# Patient Record
Sex: MALE | Race: BLACK OR AFRICAN AMERICAN | URBAN - METROPOLITAN AREA
[De-identification: names, ages, dates, MRNs, and addresses within clinical notes are randomized per-mention and may not be internally consistent; named-entity substitution may affect disease eponyms.]

---

## 2017-02-06 ENCOUNTER — EMERGENCY (EMERGENCY)
Facility: HOSPITAL | Age: 32
LOS: 0 days | Discharge: HOME | End: 2017-02-06
Attending: EMERGENCY MEDICINE
Payer: COMMERCIAL

## 2017-02-06 VITALS
HEIGHT: 67 IN | WEIGHT: 179.9 LBS | DIASTOLIC BLOOD PRESSURE: 102 MMHG | OXYGEN SATURATION: 99 % | SYSTOLIC BLOOD PRESSURE: 149 MMHG | TEMPERATURE: 97 F | RESPIRATION RATE: 16 BRPM | HEART RATE: 63 BPM

## 2017-02-06 DIAGNOSIS — R10.13 EPIGASTRIC PAIN: ICD-10-CM

## 2017-02-06 DIAGNOSIS — K59.00 CONSTIPATION, UNSPECIFIED: ICD-10-CM

## 2017-02-06 LAB
ALBUMIN SERPL ELPH-MCNC: 4.2 G/DL — SIGNIFICANT CHANGE UP (ref 3.3–5)
ALP SERPL-CCNC: 60 U/L — SIGNIFICANT CHANGE UP (ref 40–120)
ALT FLD-CCNC: 24 U/L — SIGNIFICANT CHANGE UP (ref 12–78)
ANION GAP SERPL CALC-SCNC: 7 MMOL/L — SIGNIFICANT CHANGE UP (ref 5–17)
APPEARANCE UR: CLEAR — SIGNIFICANT CHANGE UP
AST SERPL-CCNC: 14 U/L — LOW (ref 15–37)
BASOPHILS # BLD AUTO: 0.1 K/UL — SIGNIFICANT CHANGE UP (ref 0–0.2)
BASOPHILS NFR BLD AUTO: 1.9 % — SIGNIFICANT CHANGE UP (ref 0–2)
BILIRUB SERPL-MCNC: 0.5 MG/DL — SIGNIFICANT CHANGE UP (ref 0.2–1.2)
BILIRUB UR-MCNC: NEGATIVE — SIGNIFICANT CHANGE UP
BUN SERPL-MCNC: 12 MG/DL — SIGNIFICANT CHANGE UP (ref 7–23)
CALCIUM SERPL-MCNC: 10.1 MG/DL — SIGNIFICANT CHANGE UP (ref 8.5–10.1)
CHLORIDE SERPL-SCNC: 105 MMOL/L — SIGNIFICANT CHANGE UP (ref 96–108)
CO2 SERPL-SCNC: 29 MMOL/L — SIGNIFICANT CHANGE UP (ref 22–31)
COLOR SPEC: YELLOW — SIGNIFICANT CHANGE UP
CREAT SERPL-MCNC: 0.9 MG/DL — SIGNIFICANT CHANGE UP (ref 0.5–1.3)
DIFF PNL FLD: NEGATIVE — SIGNIFICANT CHANGE UP
EOSINOPHIL # BLD AUTO: 0 K/UL — SIGNIFICANT CHANGE UP (ref 0–0.5)
EOSINOPHIL NFR BLD AUTO: 0.6 % — SIGNIFICANT CHANGE UP (ref 0–6)
GLUCOSE SERPL-MCNC: 87 MG/DL — SIGNIFICANT CHANGE UP (ref 70–99)
GLUCOSE UR QL: NEGATIVE MG/DL — SIGNIFICANT CHANGE UP
HCT VFR BLD CALC: 40.4 % — SIGNIFICANT CHANGE UP (ref 39–50)
HGB BLD-MCNC: 14.8 G/DL — SIGNIFICANT CHANGE UP (ref 13–17)
KETONES UR-MCNC: NEGATIVE — SIGNIFICANT CHANGE UP
LEUKOCYTE ESTERASE UR-ACNC: NEGATIVE — SIGNIFICANT CHANGE UP
LIDOCAIN IGE QN: 244 U/L — SIGNIFICANT CHANGE UP (ref 73–393)
LYMPHOCYTES # BLD AUTO: 2.3 K/UL — SIGNIFICANT CHANGE UP (ref 1–3.3)
LYMPHOCYTES # BLD AUTO: 36.3 % — SIGNIFICANT CHANGE UP (ref 13–44)
MCHC RBC-ENTMCNC: 28.8 PG — SIGNIFICANT CHANGE UP (ref 27–34)
MCHC RBC-ENTMCNC: 36.7 GM/DL — HIGH (ref 32–36)
MCV RBC AUTO: 78.5 FL — LOW (ref 80–100)
MONOCYTES # BLD AUTO: 0.5 K/UL — SIGNIFICANT CHANGE UP (ref 0–0.9)
MONOCYTES NFR BLD AUTO: 7.5 % — SIGNIFICANT CHANGE UP (ref 2–14)
NEUTROPHILS # BLD AUTO: 3.4 K/UL — SIGNIFICANT CHANGE UP (ref 1.8–7.4)
NEUTROPHILS NFR BLD AUTO: 53.7 % — SIGNIFICANT CHANGE UP (ref 43–77)
NITRITE UR-MCNC: NEGATIVE — SIGNIFICANT CHANGE UP
PH UR: 8 — SIGNIFICANT CHANGE UP (ref 4.8–8)
PLATELET # BLD AUTO: 210 K/UL — SIGNIFICANT CHANGE UP (ref 150–400)
POTASSIUM SERPL-MCNC: 3.9 MMOL/L — SIGNIFICANT CHANGE UP (ref 3.5–5.3)
POTASSIUM SERPL-SCNC: 3.9 MMOL/L — SIGNIFICANT CHANGE UP (ref 3.5–5.3)
PROT SERPL-MCNC: 8.2 GM/DL — SIGNIFICANT CHANGE UP (ref 6–8.3)
PROT UR-MCNC: NEGATIVE MG/DL — SIGNIFICANT CHANGE UP
RBC # BLD: 5.14 M/UL — SIGNIFICANT CHANGE UP (ref 4.2–5.8)
RBC # FLD: 13.3 % — SIGNIFICANT CHANGE UP (ref 11–15)
SODIUM SERPL-SCNC: 141 MMOL/L — SIGNIFICANT CHANGE UP (ref 135–145)
SP GR SPEC: 1.01 — SIGNIFICANT CHANGE UP (ref 1.01–1.02)
UROBILINOGEN FLD QL: NEGATIVE MG/DL — SIGNIFICANT CHANGE UP
WBC # BLD: 6.3 K/UL — SIGNIFICANT CHANGE UP (ref 3.8–10.5)
WBC # FLD AUTO: 6.3 K/UL — SIGNIFICANT CHANGE UP (ref 3.8–10.5)

## 2017-02-06 PROCEDURE — 76700 US EXAM ABDOM COMPLETE: CPT | Mod: 26

## 2017-02-06 PROCEDURE — 74177 CT ABD & PELVIS W/CONTRAST: CPT | Mod: 26

## 2017-02-06 PROCEDURE — 99285 EMERGENCY DEPT VISIT HI MDM: CPT

## 2017-02-06 RX ORDER — ONDANSETRON 8 MG/1
4 TABLET, FILM COATED ORAL ONCE
Qty: 0 | Refills: 0 | Status: COMPLETED | OUTPATIENT
Start: 2017-02-06 | End: 2017-02-06

## 2017-02-06 RX ORDER — FAMOTIDINE 10 MG/ML
20 INJECTION INTRAVENOUS ONCE
Qty: 0 | Refills: 0 | Status: COMPLETED | OUTPATIENT
Start: 2017-02-06 | End: 2017-02-06

## 2017-02-06 RX ORDER — POLYETHYLENE GLYCOL 3350 17 G/17G
17 POWDER, FOR SOLUTION ORAL
Qty: 119 | Refills: 0 | OUTPATIENT
Start: 2017-02-06 | End: 2017-02-13

## 2017-02-06 RX ADMIN — ONDANSETRON 4 MILLIGRAM(S): 8 TABLET, FILM COATED ORAL at 11:47

## 2017-02-06 RX ADMIN — FAMOTIDINE 20 MILLIGRAM(S): 10 INJECTION INTRAVENOUS at 11:44

## 2017-02-06 RX ADMIN — Medication 30 MILLILITER(S): at 11:23

## 2017-02-06 NOTE — ED PROVIDER NOTE - MEDICAL DECISION MAKING DETAILS
pt with R sided abd and back pain may be related to constipation but no inflammation noted despite possible foreign body unlikely cause of pain but will given Gastro follow up to dc with tylenol and

## 2017-02-06 NOTE — ED ADULT NURSE NOTE - OBJECTIVE STATEMENT
Gassy stomach all night, ate late , has mid back pain now. Pain to right upper abdomen radiating to his back area. Pt denies nausea and no vomiting last meal this am about 4. He has been urinating more frequently

## 2017-02-06 NOTE — ED PROVIDER NOTE - OBJECTIVE STATEMENT
31 year old male without significant PMH presenting due to epigastric pain radiating to back. Denies any nausea/vomiting or diarrhea. States pain is there with movement -denies any back straining activities.

## 2017-02-07 LAB
CULTURE RESULTS: NO GROWTH — SIGNIFICANT CHANGE UP
SPECIMEN SOURCE: SIGNIFICANT CHANGE UP

## 2019-08-28 ENCOUNTER — APPOINTMENT (OUTPATIENT)
Dept: INTERNAL MEDICINE | Facility: CLINIC | Age: 34
End: 2019-08-28
Payer: COMMERCIAL

## 2019-08-28 PROCEDURE — 99385 PREV VISIT NEW AGE 18-39: CPT

## 2019-08-29 ENCOUNTER — RESULT CHARGE (OUTPATIENT)
Age: 34
End: 2019-08-29

## 2019-08-29 LAB
GLUCOSE UR-MCNC: 0
HGB UR QL STRIP.AUTO: 0
PROT UR STRIP-MCNC: NORMAL
SP GR UR STRIP: 1.02

## 2021-01-15 ENCOUNTER — APPOINTMENT (OUTPATIENT)
Dept: INTERNAL MEDICINE | Facility: CLINIC | Age: 36
End: 2021-01-15
Payer: COMMERCIAL

## 2021-01-15 VITALS — WEIGHT: 216 LBS

## 2021-01-15 DIAGNOSIS — Z00.00 ENCOUNTER FOR GENERAL ADULT MEDICAL EXAMINATION W/OUT ABNORMAL FINDINGS: ICD-10-CM

## 2021-01-15 LAB
BILIRUB UR QL STRIP: NORMAL
CLARITY UR: CLEAR
COLLECTION METHOD: NORMAL
GLUCOSE UR-MCNC: NORMAL
HCG UR QL: 0.2 EU/DL
HGB UR QL STRIP.AUTO: NORMAL
KETONES UR-MCNC: NORMAL
LEUKOCYTE ESTERASE UR QL STRIP: NORMAL
NITRITE UR QL STRIP: NORMAL
PH UR STRIP: 5.5
PROT UR STRIP-MCNC: NORMAL
SP GR UR STRIP: 1.03

## 2021-01-15 PROCEDURE — 99395 PREV VISIT EST AGE 18-39: CPT

## 2022-11-02 ENCOUNTER — OFFICE (OUTPATIENT)
Dept: URBAN - METROPOLITAN AREA CLINIC 35 | Facility: CLINIC | Age: 37
Setting detail: OPHTHALMOLOGY
End: 2022-11-02
Payer: COMMERCIAL

## 2022-11-02 DIAGNOSIS — Z96.1: ICD-10-CM

## 2022-11-02 DIAGNOSIS — H25.12: ICD-10-CM

## 2022-11-02 PROBLEM — H52.7 REFRACTIVE ERROR: Status: ACTIVE | Noted: 2022-11-02

## 2022-11-02 PROCEDURE — 99213 OFFICE O/P EST LOW 20 MIN: CPT | Performed by: OPHTHALMOLOGY

## 2022-11-02 ASSESSMENT — REFRACTION_AUTOREFRACTION
OD_SPHERE: -0.25
OD_AXIS: 066
OS_SPHERE: 0.00
OS_CYLINDER: +0.50
OS_AXIS: 004
OD_CYLINDER: +0.25

## 2022-11-02 ASSESSMENT — KERATOMETRY
OS_K1POWER_DIOPTERS: 42.00
METHOD_AUTO_MANUAL: AUTO
OS_AXISANGLE_DEGREES: 090
OD_AXISANGLE_DEGREES: 106
OS_K2POWER_DIOPTERS: 42.00
OD_K2POWER_DIOPTERS: 42.00
OD_K1POWER_DIOPTERS: 41.50

## 2022-11-02 ASSESSMENT — REFRACTION_MANIFEST
OS_SPHERE: UNABLE
OD_VA1: 20/40
OD_SPHERE: UNABLE
OS_CYLINDER: +0.25
OS_AXIS: 025
OS_SPHERE: -0.75
OD_AXIS: 145
OD_SPHERE: +0.25
OD_CYLINDER: +1.00
OS_VA1: 20/20

## 2022-11-02 ASSESSMENT — SPHEQUIV_DERIVED
OD_SPHEQUIV: 0.75
OS_SPHEQUIV: 0.25
OD_SPHEQUIV: -0.125
OS_SPHEQUIV: -0.625

## 2022-11-02 ASSESSMENT — CONFRONTATIONAL VISUAL FIELD TEST (CVF)
OS_FINDINGS: FULL
OD_FINDINGS: FULL

## 2022-11-02 ASSESSMENT — LID EXAM ASSESSMENTS
OS_MEIBOMITIS: LLL 1+
OD_MEIBOMITIS: RLL 1+

## 2022-11-02 ASSESSMENT — VISUAL ACUITY
OS_BCVA: 20/20-2
OD_BCVA: 20/20

## 2022-11-02 ASSESSMENT — AXIALLENGTH_DERIVED
OS_AL: 24.0544
OS_AL: 24.4141
OD_AL: 24.3043
OD_AL: 23.9478

## 2023-03-06 ENCOUNTER — RX ONLY (RX ONLY)
Age: 38
End: 2023-03-06

## 2023-03-06 ENCOUNTER — OFFICE (OUTPATIENT)
Dept: URBAN - METROPOLITAN AREA CLINIC 34 | Facility: CLINIC | Age: 38
Setting detail: OPHTHALMOLOGY
End: 2023-03-06
Payer: COMMERCIAL

## 2023-03-06 DIAGNOSIS — H11.153: ICD-10-CM

## 2023-03-06 DIAGNOSIS — H16.221: ICD-10-CM

## 2023-03-06 DIAGNOSIS — H00.022: ICD-10-CM

## 2023-03-06 DIAGNOSIS — H00.025: ICD-10-CM

## 2023-03-06 DIAGNOSIS — Z96.1: ICD-10-CM

## 2023-03-06 DIAGNOSIS — H40.013: ICD-10-CM

## 2023-03-06 DIAGNOSIS — H25.12: ICD-10-CM

## 2023-03-06 PROCEDURE — 92012 INTRM OPH EXAM EST PATIENT: CPT | Performed by: OPHTHALMOLOGY

## 2023-03-06 ASSESSMENT — LID EXAM ASSESSMENTS
OD_MEIBOMITIS: RLL 1+
OS_MEIBOMITIS: LLL 1+

## 2023-03-06 ASSESSMENT — KERATOMETRY
OD_AXISANGLE_DEGREES: 103
OD_K1POWER_DIOPTERS: 41.75
OS_AXISANGLE_DEGREES: 089
OS_K1POWER_DIOPTERS: 42.25
OS_K2POWER_DIOPTERS: 42.50
METHOD_AUTO_MANUAL: AUTO
OD_K2POWER_DIOPTERS: 42.25

## 2023-03-06 ASSESSMENT — REFRACTION_MANIFEST
OD_SPHERE: +0.25
OS_SPHERE: -0.75
OD_VA1: 20/40
OS_CYLINDER: +0.25
OS_SPHERE: UNABLE
OS_VA1: 20/20
OD_SPHERE: UNABLE
OD_CYLINDER: +1.00
OD_AXIS: 145
OS_AXIS: 025

## 2023-03-06 ASSESSMENT — VISUAL ACUITY
OD_BCVA: 20/20
OS_BCVA: 20/20-1

## 2023-03-06 ASSESSMENT — TONOMETRY
OS_IOP_MMHG: 16
OD_IOP_MMHG: 20

## 2023-03-06 ASSESSMENT — SPHEQUIV_DERIVED
OD_SPHEQUIV: -0.125
OS_SPHEQUIV: -0.625
OD_SPHEQUIV: 0.75

## 2023-03-06 ASSESSMENT — CONFRONTATIONAL VISUAL FIELD TEST (CVF)
OS_FINDINGS: FULL
OD_FINDINGS: FULL

## 2023-03-06 ASSESSMENT — REFRACTION_AUTOREFRACTION
OD_CYLINDER: +0.25
OD_SPHERE: -0.25
OS_AXIS: 076
OS_CYLINDER: +0.25
OD_AXIS: 080
OS_SPHERE: PLANO

## 2023-03-06 ASSESSMENT — AXIALLENGTH_DERIVED
OD_AL: 23.8535
OS_AL: 24.2674
OD_AL: 24.2072

## 2023-03-06 ASSESSMENT — SUPERFICIAL PUNCTATE KERATITIS (SPK): OD_SPK: 1+

## 2023-03-06 ASSESSMENT — DRY EYES - PHYSICIAN NOTES: OD_GENERALCOMMENTS: IN

## 2023-04-11 ENCOUNTER — OFFICE (OUTPATIENT)
Dept: URBAN - METROPOLITAN AREA CLINIC 34 | Facility: CLINIC | Age: 38
Setting detail: OPHTHALMOLOGY
End: 2023-04-11
Payer: COMMERCIAL

## 2023-04-11 DIAGNOSIS — H00.025: ICD-10-CM

## 2023-04-11 DIAGNOSIS — H11.153: ICD-10-CM

## 2023-04-11 DIAGNOSIS — H25.12: ICD-10-CM

## 2023-04-11 DIAGNOSIS — H40.013: ICD-10-CM

## 2023-04-11 DIAGNOSIS — H16.221: ICD-10-CM

## 2023-04-11 DIAGNOSIS — H00.022: ICD-10-CM

## 2023-04-11 DIAGNOSIS — Z96.1: ICD-10-CM

## 2023-04-11 PROCEDURE — 99213 OFFICE O/P EST LOW 20 MIN: CPT | Performed by: OPHTHALMOLOGY

## 2023-04-11 ASSESSMENT — REFRACTION_MANIFEST
OS_SPHERE: -0.75
OD_CYLINDER: +1.00
OS_AXIS: 025
OD_VA1: 20/40
OS_VA1: 20/20
OD_AXIS: 145
OD_SPHERE: UNABLE
OS_CYLINDER: +0.25
OD_SPHERE: +0.25
OS_SPHERE: UNABLE

## 2023-04-11 ASSESSMENT — KERATOMETRY
OS_AXISANGLE_DEGREES: 011
OD_AXISANGLE_DEGREES: 101
METHOD_AUTO_MANUAL: AUTO
OD_K1POWER_DIOPTERS: 42.00
OS_K1POWER_DIOPTERS: 42.00
OS_K2POWER_DIOPTERS: 42.25
OD_K2POWER_DIOPTERS: 42.50

## 2023-04-11 ASSESSMENT — REFRACTION_AUTOREFRACTION
OS_AXIS: 010
OD_AXIS: 084
OS_CYLINDER: +0.50
OD_CYLINDER: +0.75
OS_SPHERE: PLANO
OD_SPHERE: -1.00

## 2023-04-11 ASSESSMENT — SPHEQUIV_DERIVED
OD_SPHEQUIV: -0.625
OS_SPHEQUIV: -0.625
OD_SPHEQUIV: 0.75

## 2023-04-11 ASSESSMENT — VISUAL ACUITY
OD_BCVA: 20/20
OS_BCVA: 20/20-

## 2023-04-11 ASSESSMENT — AXIALLENGTH_DERIVED
OS_AL: 24.365
OD_AL: 23.76
OD_AL: 24.3161

## 2023-04-11 ASSESSMENT — LID EXAM ASSESSMENTS
OS_MEIBOMITIS: LLL 1+
OD_MEIBOMITIS: RLL 1+

## 2023-04-11 ASSESSMENT — CONFRONTATIONAL VISUAL FIELD TEST (CVF)
OD_FINDINGS: FULL
OS_FINDINGS: FULL

## 2023-04-11 ASSESSMENT — TONOMETRY: OS_IOP_MMHG: 19

## 2023-04-28 ENCOUNTER — OFFICE (OUTPATIENT)
Dept: URBAN - METROPOLITAN AREA CLINIC 35 | Facility: CLINIC | Age: 38
Setting detail: OPHTHALMOLOGY
End: 2023-04-28
Payer: COMMERCIAL

## 2023-04-28 ENCOUNTER — RX ONLY (RX ONLY)
Age: 38
End: 2023-04-28

## 2023-04-28 DIAGNOSIS — H16.221: ICD-10-CM

## 2023-04-28 DIAGNOSIS — H11.153: ICD-10-CM

## 2023-04-28 DIAGNOSIS — H00.14: ICD-10-CM

## 2023-04-28 PROCEDURE — 67800 REMOVE EYELID LESION: CPT | Performed by: OPHTHALMOLOGY

## 2023-04-28 PROCEDURE — 92285 EXTERNAL OCULAR PHOTOGRAPHY: CPT | Performed by: OPHTHALMOLOGY

## 2023-04-28 PROCEDURE — 92012 INTRM OPH EXAM EST PATIENT: CPT | Performed by: OPHTHALMOLOGY

## 2023-04-28 ASSESSMENT — REFRACTION_AUTOREFRACTION
OD_AXIS: 084
OD_CYLINDER: +0.75
OS_CYLINDER: +0.50
OS_SPHERE: PLANO
OD_SPHERE: -1.00
OS_AXIS: 010

## 2023-04-28 ASSESSMENT — KERATOMETRY
OS_AXISANGLE_DEGREES: 011
METHOD_AUTO_MANUAL: AUTO
OS_K1POWER_DIOPTERS: 42.00
OD_K2POWER_DIOPTERS: 42.50
OD_AXISANGLE_DEGREES: 101
OD_K1POWER_DIOPTERS: 42.00
OS_K2POWER_DIOPTERS: 42.25

## 2023-04-28 ASSESSMENT — CONFRONTATIONAL VISUAL FIELD TEST (CVF)
OD_FINDINGS: FULL
OS_FINDINGS: FULL

## 2023-04-28 ASSESSMENT — AXIALLENGTH_DERIVED: OD_AL: 24.3161

## 2023-04-28 ASSESSMENT — VISUAL ACUITY
OD_BCVA: 20/20-2
OS_BCVA: 20/20-2

## 2023-04-28 ASSESSMENT — LID EXAM ASSESSMENTS
OD_MEIBOMITIS: RLL 1+
OS_MEIBOMITIS: LLL 1+

## 2023-04-28 ASSESSMENT — SPHEQUIV_DERIVED: OD_SPHEQUIV: -0.625

## 2023-05-08 ENCOUNTER — OFFICE (OUTPATIENT)
Dept: URBAN - METROPOLITAN AREA CLINIC 35 | Facility: CLINIC | Age: 38
Setting detail: OPHTHALMOLOGY
End: 2023-05-08
Payer: COMMERCIAL

## 2023-05-08 DIAGNOSIS — H25.12: ICD-10-CM

## 2023-05-08 DIAGNOSIS — H40.013: ICD-10-CM

## 2023-05-08 DIAGNOSIS — Z96.1: ICD-10-CM

## 2023-05-08 DIAGNOSIS — H11.153: ICD-10-CM

## 2023-05-08 DIAGNOSIS — H00.14: ICD-10-CM

## 2023-05-08 PROCEDURE — 99213 OFFICE O/P EST LOW 20 MIN: CPT | Performed by: OPHTHALMOLOGY

## 2023-05-08 PROCEDURE — 76514 ECHO EXAM OF EYE THICKNESS: CPT | Performed by: OPHTHALMOLOGY

## 2023-05-08 ASSESSMENT — AXIALLENGTH_DERIVED
OS_AL: 24.156
OD_AL: 24.3102

## 2023-05-08 ASSESSMENT — REFRACTION_AUTOREFRACTION
OS_SPHERE: -0.25
OS_AXIS: 014
OS_CYLINDER: +0.50
OD_AXIS: 047
OD_CYLINDER: +0.25
OD_SPHERE: -0.50

## 2023-05-08 ASSESSMENT — TONOMETRY
OS_IOP_MMHG: 16
OD_IOP_MMHG: 15

## 2023-05-08 ASSESSMENT — PACHYMETRY
OD_CT_UM: 502
OD_CT_CORRECTION: 3
OS_CT_CORRECTION: 4
OS_CT_UM: 485

## 2023-05-08 ASSESSMENT — KERATOMETRY
OD_K2POWER_DIOPTERS: 42.25
OS_K1POWER_DIOPTERS: 42.00
METHOD_AUTO_MANUAL: AUTO
OS_K2POWER_DIOPTERS: 42.00
OD_AXISANGLE_DEGREES: 110
OS_AXISANGLE_DEGREES: 090
OD_K1POWER_DIOPTERS: 41.75

## 2023-05-08 ASSESSMENT — LID EXAM ASSESSMENTS
OS_MEIBOMITIS: LLL 1+
OD_MEIBOMITIS: RLL 1+

## 2023-05-08 ASSESSMENT — VISUAL ACUITY
OD_BCVA: 20/20
OS_BCVA: 20/20-2

## 2023-05-08 ASSESSMENT — CONFRONTATIONAL VISUAL FIELD TEST (CVF)
OS_FINDINGS: FULL
OD_FINDINGS: FULL

## 2023-05-08 ASSESSMENT — SPHEQUIV_DERIVED
OS_SPHEQUIV: 0
OD_SPHEQUIV: -0.375

## 2023-05-12 ENCOUNTER — OFFICE (OUTPATIENT)
Dept: URBAN - METROPOLITAN AREA CLINIC 35 | Facility: CLINIC | Age: 38
Setting detail: OPHTHALMOLOGY
End: 2023-05-12
Payer: COMMERCIAL

## 2023-05-12 DIAGNOSIS — H01.004: ICD-10-CM

## 2023-05-12 DIAGNOSIS — H00.14: ICD-10-CM

## 2023-05-12 DIAGNOSIS — H01.001: ICD-10-CM

## 2023-05-12 PROCEDURE — 92012 INTRM OPH EXAM EST PATIENT: CPT | Performed by: OPHTHALMOLOGY

## 2023-05-12 ASSESSMENT — KERATOMETRY
OD_K1POWER_DIOPTERS: 41.75
METHOD_AUTO_MANUAL: AUTO
OS_AXISANGLE_DEGREES: 090
OD_K2POWER_DIOPTERS: 42.25
OS_K1POWER_DIOPTERS: 42.00
OD_AXISANGLE_DEGREES: 110
OS_K2POWER_DIOPTERS: 42.00

## 2023-05-12 ASSESSMENT — CONFRONTATIONAL VISUAL FIELD TEST (CVF)
OD_FINDINGS: FULL
OS_FINDINGS: FULL

## 2023-05-12 ASSESSMENT — REFRACTION_AUTOREFRACTION
OD_SPHERE: -0.50
OS_SPHERE: -0.25
OD_CYLINDER: +0.25
OS_AXIS: 014
OD_AXIS: 047
OS_CYLINDER: +0.50

## 2023-05-12 ASSESSMENT — LID EXAM ASSESSMENTS
OD_MEIBOMITIS: RLL 1+
OS_MEIBOMITIS: LLL 1+

## 2023-05-12 ASSESSMENT — VISUAL ACUITY
OD_BCVA: 20/20
OS_BCVA: 20/20-2

## 2023-05-12 ASSESSMENT — AXIALLENGTH_DERIVED
OS_AL: 24.156
OD_AL: 24.3102

## 2023-05-12 ASSESSMENT — SPHEQUIV_DERIVED
OS_SPHEQUIV: 0
OD_SPHEQUIV: -0.375

## 2023-06-23 ENCOUNTER — OFFICE (OUTPATIENT)
Dept: URBAN - METROPOLITAN AREA CLINIC 35 | Facility: CLINIC | Age: 38
Setting detail: OPHTHALMOLOGY
End: 2023-06-23
Payer: COMMERCIAL

## 2023-06-23 DIAGNOSIS — H01.004: ICD-10-CM

## 2023-06-23 DIAGNOSIS — H00.14: ICD-10-CM

## 2023-06-23 DIAGNOSIS — H01.001: ICD-10-CM

## 2023-06-23 PROCEDURE — 92012 INTRM OPH EXAM EST PATIENT: CPT | Performed by: OPHTHALMOLOGY

## 2023-06-23 ASSESSMENT — REFRACTION_AUTOREFRACTION
OS_AXIS: 014
OD_AXIS: 047
OS_CYLINDER: +0.50
OD_CYLINDER: +0.25
OD_SPHERE: -0.50
OS_SPHERE: -0.25

## 2023-06-23 ASSESSMENT — KERATOMETRY
OD_AXISANGLE_DEGREES: 110
OS_K1POWER_DIOPTERS: 42.00
OD_K1POWER_DIOPTERS: 41.75
METHOD_AUTO_MANUAL: AUTO
OS_K2POWER_DIOPTERS: 42.00
OS_AXISANGLE_DEGREES: 090
OD_K2POWER_DIOPTERS: 42.25

## 2023-06-23 ASSESSMENT — AXIALLENGTH_DERIVED
OD_AL: 24.3102
OS_AL: 24.156

## 2023-06-23 ASSESSMENT — CONFRONTATIONAL VISUAL FIELD TEST (CVF)
OS_FINDINGS: FULL
OD_FINDINGS: FULL

## 2023-06-23 ASSESSMENT — SPHEQUIV_DERIVED
OS_SPHEQUIV: 0
OD_SPHEQUIV: -0.375

## 2023-06-23 ASSESSMENT — VISUAL ACUITY
OD_BCVA: 20/20
OS_BCVA: 20/20

## 2023-06-23 ASSESSMENT — LID EXAM ASSESSMENTS
OD_MEIBOMITIS: RLL 1+
OS_MEIBOMITIS: LLL 1+
OS_COMMENTS: LID EVERTED

## 2024-10-31 ENCOUNTER — DOCTOR'S OFFICE (OUTPATIENT)
Facility: LOCATION | Age: 39
Setting detail: OPHTHALMOLOGY
End: 2024-10-31
Payer: COMMERCIAL

## 2024-10-31 DIAGNOSIS — H11.153: ICD-10-CM

## 2024-10-31 DIAGNOSIS — H01.001: ICD-10-CM

## 2024-10-31 DIAGNOSIS — H25.12: ICD-10-CM

## 2024-10-31 DIAGNOSIS — H00.14: ICD-10-CM

## 2024-10-31 DIAGNOSIS — H01.004: ICD-10-CM

## 2024-10-31 PROCEDURE — 92014 COMPRE OPH EXAM EST PT 1/>: CPT | Performed by: OPHTHALMOLOGY

## 2024-10-31 ASSESSMENT — VISUAL ACUITY
OS_BCVA: 20/20-1
OD_BCVA: 20/20-1

## 2024-10-31 ASSESSMENT — PACHYMETRY
OS_CT_UM: 485
OD_CT_CORRECTION: 3
OS_CT_CORRECTION: 4
OD_CT_UM: 502

## 2024-10-31 ASSESSMENT — CONFRONTATIONAL VISUAL FIELD TEST (CVF)
OS_FINDINGS: FULL
OD_FINDINGS: FULL

## 2024-10-31 ASSESSMENT — TONOMETRY
OD_IOP_MMHG: 17
OS_IOP_MMHG: 15
